# Patient Record
Sex: MALE | Race: ASIAN | URBAN - METROPOLITAN AREA
[De-identification: names, ages, dates, MRNs, and addresses within clinical notes are randomized per-mention and may not be internally consistent; named-entity substitution may affect disease eponyms.]

---

## 2020-05-20 PROBLEM — 428283002: Status: ACTIVE | Noted: 2020-05-19

## 2020-05-20 PROBLEM — 267434003: Status: ACTIVE | Noted: 2020-05-19

## 2020-06-04 ENCOUNTER — OFFICE VISIT (OUTPATIENT)
Dept: URBAN - METROPOLITAN AREA CLINIC 35 | Facility: CLINIC | Age: 55
End: 2020-06-04

## 2020-06-09 ENCOUNTER — OFFICE VISIT (OUTPATIENT)
Dept: URBAN - METROPOLITAN AREA SURGERY CENTER 8 | Facility: SURGERY CENTER | Age: 55
End: 2020-06-09

## 2020-06-19 PROBLEM — 92318000: Status: ACTIVE | Noted: 2020-06-19

## 2020-06-19 PROBLEM — 91985000: Status: ACTIVE | Noted: 2020-06-19

## 2020-06-19 PROBLEM — 721704005: Status: ACTIVE | Noted: 2020-06-19

## 2020-06-19 PROBLEM — 92343006: Status: ACTIVE | Noted: 2020-06-19

## 2020-06-23 ENCOUNTER — OFFICE VISIT (OUTPATIENT)
Dept: URBAN - METROPOLITAN AREA CLINIC 37 | Facility: CLINIC | Age: 55
End: 2020-06-23

## 2020-06-23 RX ORDER — PRAVASTATIN SODIUM 10 MG/1
1 TABLET TABLET ORAL QHS
Status: ACTIVE | COMMUNITY

## 2020-06-23 RX ORDER — FAMOTIDINE 20 MG/1
1 TABLET AS NEEDED TABLET, FILM COATED ORAL TWICE A DAY
Status: ON HOLD | COMMUNITY

## 2020-06-23 RX ORDER — HYDROCORTISONE 25 MG/G
1 APPLICATION CREAM TOPICAL TWICE A DAY
Status: ON HOLD | COMMUNITY

## 2020-06-23 NOTE — HPI-MIGRATED HPI
Post-op OV : After Colonoscopy on -> 06/09/2020, at which time four small polyps were detected and resected. Histology showed they were sessile serrated , tubular adenoma and hyperplastic polyp. Non- bleeding grade II internal hemorrhoids were found during retroflexion but not banded. Good quality bowel prep;   Post-op OV : Patient denies -> rectal bleeding, fever, nausea & vomiting since the procedure date;   Post-op OV : Patient -> denies any new changes in his/her health status since last OV. Current BM: soft BM daily;

## 2023-04-01 ENCOUNTER — WEB ENCOUNTER (OUTPATIENT)
Dept: URBAN - METROPOLITAN AREA CLINIC 78 | Facility: CLINIC | Age: 58
End: 2023-04-01

## 2025-07-28 ENCOUNTER — LAB OUTSIDE AN ENCOUNTER (OUTPATIENT)
Dept: URBAN - METROPOLITAN AREA SURGERY CENTER 13 | Facility: SURGERY CENTER | Age: 60
End: 2025-07-28

## 2025-08-04 ENCOUNTER — CLAIMS CREATED FROM THE CLAIM WINDOW (OUTPATIENT)
Dept: URBAN - METROPOLITAN AREA SURGERY CENTER 13 | Facility: SURGERY CENTER | Age: 60
End: 2025-08-04
Payer: COMMERCIAL

## 2025-08-04 DIAGNOSIS — Z86.0100 HISTORY OF COLON POLYPS: ICD-10-CM

## 2025-08-04 DIAGNOSIS — D12.5 ADENOMA OF SIGMOID COLON: ICD-10-CM

## 2025-08-04 DIAGNOSIS — D12.3 ADENOMA OF TRANSVERSE COLON: ICD-10-CM

## 2025-08-04 DIAGNOSIS — K63.5 BENIGN COLON POLYP: ICD-10-CM

## 2025-08-04 PROCEDURE — 00811 ANES LWR INTST NDSC NOS: CPT | Performed by: ANESTHESIOLOGY

## 2025-08-04 PROCEDURE — 45385 COLONOSCOPY W/LESION REMOVAL: CPT | Performed by: INTERNAL MEDICINE

## 2025-08-04 PROCEDURE — 0529F INTRVL 3/>YR PTS CLNSCP DOCD: CPT | Performed by: INTERNAL MEDICINE

## 2025-08-04 PROCEDURE — 00811 ANES LWR INTST NDSC NOS: CPT | Performed by: ANESTHESIOLOGIST ASSISTANT

## 2025-08-04 RX ORDER — FAMOTIDINE 20 MG/1
1 TABLET AS NEEDED TABLET, FILM COATED ORAL TWICE A DAY
Status: ON HOLD | COMMUNITY

## 2025-08-04 RX ORDER — HYDROCORTISONE 25 MG/G
1 APPLICATION CREAM TOPICAL TWICE A DAY
Status: ON HOLD | COMMUNITY

## 2025-08-04 RX ORDER — PRAVASTATIN SODIUM 10 MG/1
1 TABLET TABLET ORAL QHS
Status: ACTIVE | COMMUNITY